# Patient Record
Sex: FEMALE | URBAN - METROPOLITAN AREA
[De-identification: names, ages, dates, MRNs, and addresses within clinical notes are randomized per-mention and may not be internally consistent; named-entity substitution may affect disease eponyms.]

---

## 2017-02-15 ENCOUNTER — OFFICE VISIT (OUTPATIENT)
Dept: GASTROENTEROLOGY | Facility: CLINIC | Age: 27
End: 2017-02-15
Payer: COMMERCIAL

## 2017-02-15 VITALS — WEIGHT: 167 LBS | SYSTOLIC BLOOD PRESSURE: 105 MMHG | HEART RATE: 95 BPM | DIASTOLIC BLOOD PRESSURE: 71 MMHG

## 2017-02-15 DIAGNOSIS — R19.7 DIARRHEA, UNSPECIFIED TYPE: Primary | ICD-10-CM

## 2017-02-15 PROCEDURE — 99203 OFFICE O/P NEW LOW 30 MIN: CPT | Performed by: INTERNAL MEDICINE

## 2017-02-15 RX ORDER — MEDROXYPROGESTERONE ACETATE 150 MG/ML
150 INJECTION, SUSPENSION INTRAMUSCULAR
COMMUNITY
Start: 2016-11-28 | End: 2017-10-30

## 2017-02-15 RX ORDER — FLUTICASONE PROPIONATE 50 MCG
1 SPRAY, SUSPENSION (ML) NASAL
COMMUNITY
Start: 2016-04-19

## 2017-02-15 RX ORDER — SERTRALINE HYDROCHLORIDE 100 MG/1
100 TABLET, FILM COATED ORAL
COMMUNITY
Start: 2017-01-30

## 2017-02-15 NOTE — MR AVS SNAPSHOT
After Visit Summary   2/15/2017    Shira Barrientos    MRN: [id number]           Patient Information     Date Of Birth          1990        Visit Information        Provider Department      2/15/2017 3:00 PM Christiano Castaneda MD [facility]        Today's Diagnoses     Diarrhea, unspecified type    -  1       Follow-ups after your visit        Future tests that were ordered for you today     Open Standing Orders        Priority Remaining Interval Expires Ordered    Clostridium difficile toxin B PCR Routine 1/1  2/15/2018 2/15/2017            Who to contact     If you have questions or need follow up information about today's clinic visit or your schedule please contact [facility] directly at [phone].  Normal or non-critical lab and imaging results will be communicated to you by MyChart, letter or phone within 4 business days after the clinic has received the results. If you do not hear from us within 7 days, please contact the clinic through MyChart or phone. If you have a critical or abnormal lab result, we will notify you by phone as soon as possible.  Submit refill requests through [employer] or call your pharmacy and they will forward the refill request to us. Please allow 3 business days for your refill to be completed.          Additional Information About Your Visit        MyChart Information     [employer] is an electronic gateway that provides easy, online access to your medical records. With [employer], you can request a clinic appointment, read your test results, renew a prescription or communicate with your care team.     To sign up for [employer] visit the website at [email or web address]   You will be asked to enter the access code listed below, as well as some personal information. Please follow the directions to create your username and password.     Your access code is: [email or web address]  Expires: 5/16/2017  4:58 PM     Your access code will   in 90 days. If you need help or a new code, please contact your [facility] or call [phone] for assistance.        Care EveryWhere ID     This is your Care EveryWhere ID. This could be used by other organizations to access your [place] medical records  [id number]        Your Vitals Were     Pulse                   95            Blood Pressure from Last 3 Encounters:   02/15/17 105/71    Weight from Last 3 Encounters:   02/15/17 75.8 kg (167 lb)               Primary Care Provider Office Phone # Fax #    Em Dias [phone]       [facility] [address] [place] [address]        Thank you!     Thank you for choosing [facility]  for your care. Our goal is always to provide you with excellent care. Hearing back from our patients is one way we can continue to improve our services. Please take a few minutes to complete the written survey that you may receive in the mail after your visit with us. Thank you!             Your Updated Medication List - Protect others around you: Learn how to safely use, store and throw away your medicines at www.disposemymeds.org.          This list is accurate as of: 2/15/17  4:58 PM.  Always use your most recent med list.                   Brand Name Dispense Instructions for use    fluticasone 50 MCG/ACT spray    FLONASE     1 spray       MedroxyPROGESTERone Acetate 150 MG/ML syringe    DEPO-PROVERA     Inject 150 mg into the muscle       probiotic Caps          sertraline 100 MG tablet    ZOLOFT     Take 100 mg by mouth

## 2017-02-15 NOTE — NURSING NOTE
Shira Barrientos's goals for this visit include:   Chief Complaint   Patient presents with     Consult       She requests these members of her care team be copied on today's visit information: pcp    PCP: Em Dias    Referring Provider:  No referring provider defined for this encounter.    Chief Complaint   Patient presents with     Consult       Initial Wt 75.8 kg (167 lb) There is no height or weight on file to calculate BMI.  Medication Reconciliation: complete    Do you need any medication refills at today's visit? No    Em Jones CMA ([facility])

## 2017-02-16 NOTE — PROGRESS NOTES
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  History was obtained from the patient herself, exclusively.  I did not receive old records and at least some elements of the history are corroborated in the Care Everywhere although I do not believe that record is complete.  The basic story is that in 01/2016 she developed spontaneous diarrhea which was quite severe and was diagnosed with C. difficile infection.  There was no preceding antibiotic identified at that time or in retrospect there were no surgical procedures, there were no dental procedures and through looking through Care Everywhere I do not see an encounter with antibiotics.  She was treated with a single course of vancomycin and in fact was fine for 9 months until spontaneously the infection came back in September.  She was once again put on vancomycin and then relapsed, was put on vancomycin taper.  In December 2016 about 2 weeks following completion of the taper she had a colonoscopy done which was unremarkable.  The visualization of the ileum was accomplished, biopsies were done and were unremarkable in the terminal ileum and in the colon.  However, shortly after that she relapsed with C. difficile infection and took another course of vancomycin which she finished about 2 weeks ago.  Her symptoms are still at baseline.  During the active C. difficile infection she had type 7 stools, her normal is type 4 when taking vancomycin or even now they vary from type 5 to type 7 but the frequency is much less.        PAST MEDICAL HISTORY:  The rest of the past medical history is quite unremarkable.  She has not had any surgical procedures.        SOCIAL HISTORY:  She drinks about a cup of Pepsi per day.  She does not smoke.  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  She has 2 children, at the time when she developed the spontaneous  Clostridium difficile infection her daughter was 8 months old, at one time her daughter had some diarrheal symptoms and was C. difficile positive and actually treated with antibiotics for fear that she may be passing the C. difficile to her mother.      PHYSICAL EXAMINATION:   GENERAL:  She is in no acute distress.   VITAL SIGNS:  Stable.      ASSESSMENT AND PLAN:  The patient if she develops a relapse of Clostridium difficile infection will qualify for fecal microbiota transplantation.  Since she had a colonoscopy done already I suggested we would do that via oral capsule preparation.  We discussed at some length the 2 types of the preparation and our results up to date.  The unnerving part about this case is that the C. difficile infection showed up with no antibiotic provocation in the first place and that always raises the question of underlying inflammatory bowel disease or some other intestinal issue, however, she already had a colonoscopy with terminal ileum visualization that did not see any problems.  It is certainly plausible that the source of Clostridium difficile was her young daughter as 50% of newborns are colonized with C. difficile in many studies.  However mere exposure to C. difficile does not necessarily result into infection unless the microbiota start compromised in the first place typically by antibiotics.  In general, we avoid merely diagnostic colonoscopies without FMT in patients who are trying to recover from C. difficile infection because the colonoscopy prep itself is not risk free with respect to stability of the gut microbes.      TOTAL TIME SPENT:  Total time spent in face-to-face consultation was 30 minutes, over 50% was spent counseling and coordinating care.         FRANCISCO COON MD             D: 02/15/2017 16:57   T: 2017 05:43   MT: [id number]      Name:     LUISA MONTGOMERY   MRN:      [id number]        Account:      [id number]   :      1990            Visit Date:   02/15/2017      Document: [id number]

## 2022-12-09 NOTE — PROGRESS NOTES
Note dictated.   Dapsone Pregnancy And Lactation Text: This medication is Pregnancy Category C and is not considered safe during pregnancy or breast feeding.